# Patient Record
Sex: FEMALE | Race: WHITE | Employment: OTHER | URBAN - METROPOLITAN AREA
[De-identification: names, ages, dates, MRNs, and addresses within clinical notes are randomized per-mention and may not be internally consistent; named-entity substitution may affect disease eponyms.]

---

## 2017-08-14 ENCOUNTER — HOSPITAL ENCOUNTER (OUTPATIENT)
Dept: SURGERY | Age: 67
Discharge: HOME | End: 2017-08-14
Admitting: PODIATRIST

## 2017-08-14 VITALS
HEIGHT: 66 IN | SYSTOLIC BLOOD PRESSURE: 94 MMHG | OXYGEN SATURATION: 98 % | RESPIRATION RATE: 16 BRPM | HEART RATE: 71 BPM | TEMPERATURE: 98.2 F | BODY MASS INDEX: 22.6 KG/M2 | WEIGHT: 140.6 LBS | DIASTOLIC BLOOD PRESSURE: 58 MMHG

## 2017-08-14 RX ORDER — HYDROCODONE BITARTRATE AND ACETAMINOPHEN 7.5; 325 MG/1; MG/1
.5-1 TABLET ORAL
COMMUNITY
Start: 2017-06-24

## 2017-08-14 NOTE — PERIOP NOTES
Surgical orders not yet received for preassessment today. Called office and spoke with Leyda Ceja. Requested orders to be faxed immediately. Leyda Ceja confirms that no labs are ordered and states that she will fax now to [phone].

## 2017-08-14 NOTE — PERIOP NOTES
Patient verified name, , and surgery as listed in [facility]. TYPE  CASE:1A            Orders: received. Labs per surgeon:  none   Labs per anesthesia protocol: none  EKG  :  Not required      Instructed patient to continue previous medications as prescribed prior to surgery and  to take the following medications the day of surgery according to anesthesia guidelines with a small sip of water : Bystolic, Prevacid and Norco       Continue all previous medications unless otherwise directed. Instructed patient to hold  the following medications prior to surgery: none    Patient instructed on the following and verbalized understanding:  Arrive at [facility] entrance, time of arrival to be called the day before by 1700. Responsible adult must drive patient to and from hospital, stay during surgery and 24 hours postoperatively. Npo after midnight including gum, mints and ice chips. Shower using non-moisturizing soap the night before and the morning of surgery. Leave all valuables at home. Instructed on no jewelry or body piercings on the dos. Bring insurance card and ID. No perfumes, oil, powder, colognes, makeup or  lotions on the skin. Patient verbalized understanding of all instructions and provided all medical/health information to the best of their ability.

## 2017-08-16 NOTE — PROGRESS NOTES
Received consult to assist Ms. Silvestre Trujillo with a HCPOA prior to surgery on 8.18.17. Spoke with Ms. Silvestre Trujillo via phone on 8.16.17 and she declined needing assistance with the document prior to surgery. Ms. Silvestre Trujillo indicated that she already had a will.     Jerson Choudhury 69  Board Certified

## 2017-08-16 NOTE — ACP (ADVANCE CARE PLANNING)
Received consult to assist Ms. Hellen Bartlett with a HCPOA prior to surgery on 8.18.17. Spoke with Ms. Hellen Bartlett via phone on 8.16.17 and she declined needing assistance with the document prior to surgery. Ms. Hellen Bartlett indicated that she already had a will.     Jerson Gonzalez 69  Board Certified

## 2017-08-17 ENCOUNTER — ANESTHESIA EVENT (OUTPATIENT)
Dept: SURGERY | Age: 67
End: 2017-08-17
Payer: MEDICARE

## 2017-08-17 RX ORDER — FENTANYL CITRATE 50 UG/ML
100 INJECTION, SOLUTION INTRAMUSCULAR; INTRAVENOUS ONCE
Status: CANCELLED | OUTPATIENT
Start: 2017-08-17 | End: 2017-08-17

## 2017-08-18 ENCOUNTER — ANESTHESIA (OUTPATIENT)
Dept: SURGERY | Age: 67
End: 2017-08-18
Payer: MEDICARE

## 2017-08-18 ENCOUNTER — HOSPITAL ENCOUNTER (OUTPATIENT)
Age: 67
Setting detail: OUTPATIENT SURGERY
Discharge: HOME | End: 2017-08-18
Attending: PODIATRIST | Admitting: PODIATRIST
Payer: MEDICARE

## 2017-08-18 VITALS
HEART RATE: 59 BPM | RESPIRATION RATE: 16 BRPM | OXYGEN SATURATION: 96 % | SYSTOLIC BLOOD PRESSURE: 103 MMHG | DIASTOLIC BLOOD PRESSURE: 70 MMHG | TEMPERATURE: 97.8 F

## 2017-08-18 PROCEDURE — 77030031139 HC SUT VCRL2 J&J -A: Performed by: PODIATRIST

## 2017-08-18 PROCEDURE — 77030018836 HC SOL IRR NACL ICUM -A: Performed by: PODIATRIST

## 2017-08-18 PROCEDURE — 74011250636 HC RX REV CODE- 250/636: Performed by: PODIATRIST

## 2017-08-18 PROCEDURE — 77030002916 HC SUT ETHLN J&J -A: Performed by: PODIATRIST

## 2017-08-18 PROCEDURE — 77030011640 HC PAD GRND REM COVD -A: Performed by: PODIATRIST

## 2017-08-18 PROCEDURE — 77030019940 HC BLNKT HYPOTHRM STRY -B: Performed by: ANESTHESIOLOGY

## 2017-08-18 PROCEDURE — 74011250636 HC RX REV CODE- 250/636

## 2017-08-18 PROCEDURE — 74011000250 HC RX REV CODE- 250

## 2017-08-18 PROCEDURE — 76060000032 HC ANESTHESIA 0.5 TO 1 HR: Performed by: PODIATRIST

## 2017-08-18 PROCEDURE — 76210000026 HC REC RM PH II 1 TO 1.5 HR: Performed by: PODIATRIST

## 2017-08-18 PROCEDURE — 76010000138 HC OR TIME 0.5 TO 1 HR: Performed by: PODIATRIST

## 2017-08-18 PROCEDURE — 77030000032 HC CUF TRNQT ZIMM -B: Performed by: PODIATRIST

## 2017-08-18 PROCEDURE — 74011250636 HC RX REV CODE- 250/636: Performed by: ANESTHESIOLOGY

## 2017-08-18 PROCEDURE — 88305 TISSUE EXAM BY PATHOLOGIST: CPT | Performed by: PODIATRIST

## 2017-08-18 RX ORDER — PROMETHAZINE HYDROCHLORIDE 25 MG/1
25 TABLET ORAL
Qty: 30 TAB | Refills: 0 | Status: SHIPPED | OUTPATIENT
Start: 2017-08-18 | End: 2017-09-01

## 2017-08-18 RX ORDER — PROPOFOL 10 MG/ML
INJECTION, EMULSION INTRAVENOUS
Status: DISCONTINUED | OUTPATIENT
Start: 2017-08-18 | End: 2017-08-18 | Stop reason: HOSPADM

## 2017-08-18 RX ORDER — CEFAZOLIN SODIUM IN 0.9 % NACL 2 G/50 ML
2 INTRAVENOUS SOLUTION, PIGGYBACK (ML) INTRAVENOUS ONCE
Status: COMPLETED | OUTPATIENT
Start: 2017-08-18 | End: 2017-08-18

## 2017-08-18 RX ORDER — ALBUTEROL SULFATE 0.83 MG/ML
2.5 SOLUTION RESPIRATORY (INHALATION) AS NEEDED
Status: DISCONTINUED | OUTPATIENT
Start: 2017-08-18 | End: 2017-08-18 | Stop reason: HOSPADM

## 2017-08-18 RX ORDER — SODIUM CHLORIDE 0.9 % (FLUSH) 0.9 %
5-10 SYRINGE (ML) INJECTION EVERY 8 HOURS
Status: DISCONTINUED | OUTPATIENT
Start: 2017-08-18 | End: 2017-08-18 | Stop reason: HOSPADM

## 2017-08-18 RX ORDER — MIDAZOLAM HYDROCHLORIDE 1 MG/ML
2 INJECTION, SOLUTION INTRAMUSCULAR; INTRAVENOUS ONCE
Status: DISCONTINUED | OUTPATIENT
Start: 2017-08-18 | End: 2017-08-18 | Stop reason: HOSPADM

## 2017-08-18 RX ORDER — SODIUM CHLORIDE 0.9 % (FLUSH) 0.9 %
5-10 SYRINGE (ML) INJECTION AS NEEDED
Status: DISCONTINUED | OUTPATIENT
Start: 2017-08-18 | End: 2017-08-18 | Stop reason: HOSPADM

## 2017-08-18 RX ORDER — PROPOFOL 10 MG/ML
INJECTION, EMULSION INTRAVENOUS AS NEEDED
Status: DISCONTINUED | OUTPATIENT
Start: 2017-08-18 | End: 2017-08-18 | Stop reason: HOSPADM

## 2017-08-18 RX ORDER — OXYCODONE HYDROCHLORIDE 5 MG/1
5 TABLET ORAL
Status: DISCONTINUED | OUTPATIENT
Start: 2017-08-18 | End: 2017-08-18 | Stop reason: HOSPADM

## 2017-08-18 RX ORDER — MIDAZOLAM HYDROCHLORIDE 1 MG/ML
2 INJECTION, SOLUTION INTRAMUSCULAR; INTRAVENOUS
Status: DISCONTINUED | OUTPATIENT
Start: 2017-08-18 | End: 2017-08-18 | Stop reason: HOSPADM

## 2017-08-18 RX ORDER — SODIUM CHLORIDE, SODIUM LACTATE, POTASSIUM CHLORIDE, CALCIUM CHLORIDE 600; 310; 30; 20 MG/100ML; MG/100ML; MG/100ML; MG/100ML
75 INJECTION, SOLUTION INTRAVENOUS CONTINUOUS
Status: DISCONTINUED | OUTPATIENT
Start: 2017-08-18 | End: 2017-08-18 | Stop reason: HOSPADM

## 2017-08-18 RX ORDER — LIDOCAINE HYDROCHLORIDE 20 MG/ML
INJECTION, SOLUTION EPIDURAL; INFILTRATION; INTRACAUDAL; PERINEURAL AS NEEDED
Status: DISCONTINUED | OUTPATIENT
Start: 2017-08-18 | End: 2017-08-18 | Stop reason: HOSPADM

## 2017-08-18 RX ORDER — SODIUM CHLORIDE, SODIUM LACTATE, POTASSIUM CHLORIDE, CALCIUM CHLORIDE 600; 310; 30; 20 MG/100ML; MG/100ML; MG/100ML; MG/100ML
50 INJECTION, SOLUTION INTRAVENOUS CONTINUOUS
Status: DISCONTINUED | OUTPATIENT
Start: 2017-08-18 | End: 2017-08-18 | Stop reason: HOSPADM

## 2017-08-18 RX ORDER — LIDOCAINE HYDROCHLORIDE 10 MG/ML
0.1 INJECTION INFILTRATION; PERINEURAL AS NEEDED
Status: DISCONTINUED | OUTPATIENT
Start: 2017-08-18 | End: 2017-08-18 | Stop reason: HOSPADM

## 2017-08-18 RX ORDER — CEPHALEXIN 500 MG/1
500 CAPSULE ORAL 3 TIMES DAILY
Qty: 30 CAP | Refills: 0 | Status: SHIPPED | OUTPATIENT
Start: 2017-08-18 | End: 2017-08-28

## 2017-08-18 RX ORDER — MEPERIDINE HYDROCHLORIDE 50 MG/1
50 TABLET ORAL
Qty: 25 TAB | Refills: 0 | Status: SHIPPED | OUTPATIENT
Start: 2017-08-18 | End: 2018-12-13

## 2017-08-18 RX ADMIN — SODIUM CHLORIDE, SODIUM LACTATE, POTASSIUM CHLORIDE, AND CALCIUM CHLORIDE 75 ML/HR: 600; 310; 30; 20 INJECTION, SOLUTION INTRAVENOUS at 11:14

## 2017-08-18 RX ADMIN — PROPOFOL 250 MCG/KG/MIN: 10 INJECTION, EMULSION INTRAVENOUS at 13:41

## 2017-08-18 RX ADMIN — LIDOCAINE HYDROCHLORIDE 40 MG: 20 INJECTION, SOLUTION EPIDURAL; INFILTRATION; INTRACAUDAL; PERINEURAL at 13:43

## 2017-08-18 RX ADMIN — PROPOFOL 40 MG: 10 INJECTION, EMULSION INTRAVENOUS at 13:41

## 2017-08-18 RX ADMIN — PROPOFOL 10 MG: 10 INJECTION, EMULSION INTRAVENOUS at 13:42

## 2017-08-18 RX ADMIN — CEFAZOLIN 2 G: 1 INJECTION, POWDER, FOR SOLUTION INTRAMUSCULAR; INTRAVENOUS; PARENTERAL at 13:38

## 2017-08-18 NOTE — IP AVS SNAPSHOT
[address] 
[phone] Patient: Bj Urbina MRN: [id number] RX You are allergic to the following Allergen Reactions Dilaudid (Hydromorphone (Bulk)) Other (comments) Hypotension Doxycycline Unable to Obtain \"I can't remember\" Fluoxetine Unknown (comments) Pt states \"induced afib\" Ketorolac Tromethamine Other (comments) Could not focus eye- could not see Morphine Itching Nausea and Vomiting Narcotic Antagonist Itching Nausea and Vomiting Other (comments) Narcotics--- not narcotic antagonisthallucinations Sulfa (Sulfonamide Antibiotics) Itching Triamcinolone Other (comments) \"couldnt focus eyes\"- could not see Recent Documentation OB Status Smoking Status Hysterectomy Current Every Day Smoker Emergency Contacts Name Discharge Info Relation Home Work Mobile Brendan Mckeon NO [2] Friend [5] [phone] About your hospitalization You were admitted on:  2017 You last received care in the:  [facility] OP PACU You were discharged on:  2017 Unit phone number:  [phone] Why you were hospitalized Your primary diagnosis was:  Not on File Providers Seen During Your Hospitalizations Provider Role Specialty Primary office phone Lacy Dominguez DPM Attending Provider Podiatry [phone] Your Primary Care Physician (PCP) Primary Care Physician Office Phone Office Fax [address] [facility] [phone] Follow-up Information Follow up With Details Comments Contact Info Falguni Daniel MD   [address] 
[phone] Current Discharge Medication List  
  
START taking these medications Dose & Instructions Dispensing Information Comments Morning Noon Evening Bedtime  
 cephALEXin 500 mg capsule Commonly known as:  Shilpa Leggett Your last dose was: Your next dose is:    
   
   
 Dose:  500 mg Take 1 Cap by mouth three (3) times daily for 10 days. Quantity:  30 Cap Refills:  0  
     
   
   
   
  
 meperidine 50 mg tablet Commonly known as:  DEMEROL Your last dose was: Your next dose is:    
   
   
 Dose:  50 mg Take 1 Tab by mouth every six (6) hours as needed (for severe pain). Max Daily Amount: 200 mg. Quantity:  25 Tab Refills:  0  
     
   
   
   
  
 promethazine 25 mg tablet Commonly known as:  PHENERGAN Your last dose was: Your next dose is:    
   
   
 Dose:  25 mg Take 1 Tab by mouth every six (6) hours as needed for Nausea for up to 14 days. Quantity:  30 Tab Refills:  0 CONTINUE these medications which have CHANGED Dose & Instructions Dispensing Information Comments Morning Noon Evening Bedtime  
 nebivolol 5 mg tablet Commonly known as:  BYSTOLIC What changed:  when to take this Your last dose was: Your next dose is:    
   
   
 Dose:  5 mg Take 1 Tab by mouth daily. Quantity:  30 Tab Refills:  11 CONTINUE these medications which have NOT CHANGED Dose & Instructions Dispensing Information Comments Morning Noon Evening Bedtime ALPRAZolam 1 mg tablet Commonly known as:  Zenia Atkins Your last dose was: Your next dose is:    
   
   
 Dose:  1 mg Take 1 mg by mouth nightly as needed. Refills:  0  
     
   
   
   
  
 carisoprodol 350 mg tablet Commonly known as:  SOMA Your last dose was: Your next dose is:    
   
   
 Dose:  350 mg Take 350 mg by mouth every eight (8) hours as needed (\"very seldom\"). Indications: MUSCLE SPASM Refills:  0 HYDROcodone-acetaminophen 7.5-325 mg per tablet Commonly known as:  Sarahy Cannon Your last dose was: Your next dose is:    
   
   
 Dose:  0.5-1 Tab Take 0.5-1 Tabs by mouth every six (6) hours as needed (\"fibromyalgia and other pain\"). Refills:  0  
     
   
   
   
  
 lansoprazole 15 mg capsule Commonly known as:  PREVACID Your last dose was: Your next dose is:    
   
   
 Dose:  15 mg Take 15 mg by mouth daily as needed. Indications: HEARTBURN Refills:  0  
     
   
   
   
  
 magnesium oxide 400 mg tablet Commonly known as:  MAG-OX Your last dose was: Your next dose is:    
   
   
 Dose:  400 mg Take 400 mg by mouth daily. Refills:  0  
     
   
   
   
  
 potassium chloride 10 mEq tablet Commonly known as:  KLOR-CON Your last dose was: Your next dose is:    
   
   
 Dose:  10 mEq Take 1 Tab by mouth daily. Quantity:  90 Tab Refills:  3  
     
   
   
   
  
 simvastatin 40 mg tablet Commonly known as:  ZOCOR Your last dose was: Your next dose is:    
   
   
 Dose:  40 mg Take 40 mg by mouth nightly. Refills:  0  
     
   
   
   
  
 VITAMIN D3 PO Your last dose was: Your next dose is:    
   
   
 Dose:  1500 Units Take 1,500 Units by mouth daily. Refills:  0 Where to Get Your Medications Information on where to get these meds will be given to you by the nurse or doctor. ! Ask your nurse or doctor about these medications  
  cephALEXin 500 mg capsule  
 meperidine 50 mg tablet  
 promethazine 25 mg tablet Discharge Instructions Ice and elevate left foot. Patient to remain nonweightbearing with crutches. [facility] Post operative instructions and recommendations for your personal comfort following foot surgery 1.  Upon arrival home, lie down and elevate your feet and legs approximately 16 on pillows. Also, support your knees with pillows. 2. Post-operative swelling is greatly reduced by the use of ice packs. Ice packs should be applied over the top or bottom of the bandage every twenty minutes on the hour until returning to the office. 3. Take medication prescribed by the doctor according to directions. Avoid taking medication on an empty stomach. 4. Remain quiet and off of your feet as much as possible for the first 48 hours. After this period use discretion and common sense regarding the amount of standing or walking you do. Generally, post-operative patients should stay off their feet for 24-48hours. After this period walking to tolerance is usually allowed (unless otherwise specified by your doctor). 5. Do not be alarmed if there is some slight bleeding on the bandages; good blood supply is essential for normal tissue healing. 6. Keep feet elevated as much as possible for the first three days. Generally above the chest is most desirable. 7. Keep bandages completely dry. 8. Do not remove the surgical bandages. 9. The successful result of your surgery depends on the careful following of these instructions. 10. Please refrain from all alcoholic beverages. 11. If there are any questions or problems, please feel free to phone the doctor at the office. Raúl Zapata, [address] [place] [address] [phone] · 414 [facility] [phone] DIET · Clear liquids until no nausea or vomiting; then light diet for the first day. · Advance to regular diet on second day, unless your doctor orders otherwise. · If nausea and vomiting continues, call your doctor. PAIN 
· Take pain medication as directed by your doctor. · Call your doctor if pain is NOT relieved by medication. CALL YOUR DOCTOR IF  
· Excessive bleeding that does not stop after holding pressure over the area · Temperature of 101 degrees F or above · Excessive redness, swelling or bruising, and/ or green or yellow, smelly discharge from incision AFTER ANESTHESIA · For the first 24 hours: DO NOT Drive, Drink alcoholic beverages, or Make important decisions. · Be aware of dizziness following anesthesia and while taking pain medication. APPOINTMENT DATE/ TIME keep as arranged DISCHARGE SUMMARY from Nurse PATIENT INSTRUCTIONS: 
 
After general anesthesia or intravenous sedation, for 24 hours or while taking prescription Narcotics: · Limit your activities · Do not drive and operate hazardous machinery · Do not make important personal or business decisions · Do  not drink alcoholic beverages · If you have not urinated within 8 hours after discharge, please contact your surgeon on call. *  Please give a list of your current medications to your Primary Care Provider. *  Please update this list whenever your medications are discontinued, doses are 
    changed, or new medications (including over-the-counter products) are added. *  Please carry medication information at all times in case of emergency situations. These are general instructions for a healthy lifestyle: No smoking/ No tobacco products/ Avoid exposure to second hand smoke Surgeon General's Warning:  Quitting smoking now greatly reduces serious risk to your health. Obesity, smoking, and sedentary lifestyle greatly increases your risk for illness A healthy diet, regular physical exercise & weight monitoring are important for maintaining a healthy lifestyle You may be retaining fluid if you have a history of heart failure or if you experience any of the following symptoms:  Weight gain of 3 pounds or more overnight or 5 pounds in a week, increased swelling in our hands or feet or shortness of breath while lying flat in bed. Please call your doctor as soon as you notice any of these symptoms; do not wait until your next office visit. Recognize signs and symptoms of STROKE: 
 
F-face looks uneven A-arms unable to move or move unevenly S-speech slurred or non-existent T-time-call 911 as soon as signs and symptoms begin-DO NOT go Back to bed or wait to see if you get better-TIME IS BRAIN. Discharge Orders None Introducing [facility]! [facility] introduces [employer] patient portal. Now you can access parts of your medical record, email your doctor's office, and request medication refills online. 1. In your internet browser, go to [email or web address] 2. Click on the First Time User? Click Here link in the Sign In box. You will see the New Member Sign Up page. 3. Enter your [employer] Access Code exactly as it appears below. You will not need to use this code after youve completed the sign-up process. If you do not sign up before the expiration date, you must request a new code. · [employer] Access Code: [email or web address] Expires: 11/5/2017  3:20 PM 
 
4. Enter the last four digits of your Social Security Number (xxxx) and Date of Birth (mm/dd/yyyy) as indicated and click Submit. You will be taken to the next sign-up page. 5. Create a [employer] ID. This will be your [employer] login ID and cannot be changed, so think of one that is secure and easy to remember. 6. Create a [employer] password. You can change your password at any time. 7. Enter your Password Reset Question and Answer. This can be used at a later time if you forget your password. 8. Enter your e-mail address. You will receive e-mail notification when new information is available in [address] 9. Click Sign Up. You can now view and download portions of your medical record.  
10. Click the Download Summary menu link to download a portable copy of your medical information. If you have questions, please visit the Frequently Asked Questions section of the [employer] website. Remember, MyChart is NOT to be used for urgent needs. For medical emergencies, dial 911. Now available from your iPhone and Android! General Information Please provide this summary of care documentation to your next provider. Patient Signature:  ____________________________________________________________ Date:  ____________________________________________________________  
  
[place] [facility] Provider Signature:  ____________________________________________________________ Date:  ____________________________________________________________

## 2017-08-18 NOTE — ANESTHESIA POSTPROCEDURE EVALUATION
Post-Anesthesia Evaluation and Assessment    Patient: Clary Boeck MRN: [id number]  SSN: [id number]    YOB: 1950  Age: 78 y.o. Sex: female       Cardiovascular Function/Vital Signs  Visit Vitals    BP 96/60    Pulse 70    Temp 36.6 °C (97.8 °F)    Resp 16    SpO2 96%       Patient is status post total IV anesthesia anesthesia for Procedure(s):  PLANTAR FASCIA REPAIR LEFT FOOT. Nausea/Vomiting: None    Postoperative hydration reviewed and adequate. Pain:      Managed    Neurological Status:   Neuro (WDL): Within Defined Limits (08/18/17 1037)   At baseline    Mental Status and Level of Consciousness: Arousable    Pulmonary Status:   O2 Device: Room air (08/18/17 1429)   Adequate oxygenation and airway patent    Complications related to anesthesia: None    Post-anesthesia assessment completed.  No concerns    Signed By: Milady Love MD     August 18, 2017

## 2017-08-18 NOTE — DISCHARGE INSTRUCTIONS
Ice and elevate left foot. Patient to remain nonweightbearing with crutches. [facility]    Post operative instructions and recommendations for your personal comfort following foot surgery    1. Upon arrival home, lie down and elevate your feet and legs approximately 16 on pillows. Also, support your knees with pillows. 2. Post-operative swelling is greatly reduced by the use of ice packs. Ice packs should be applied over the top or bottom of the bandage every twenty minutes on the hour until returning to the office. 3. Take medication prescribed by the doctor according to directions. Avoid taking medication on an empty stomach. 4. Remain quiet and off of your feet as much as possible for the first 48 hours. After this period use discretion and common sense regarding the amount of standing or walking you do. Generally, post-operative patients should stay off their feet for 24-48hours. After this period walking to tolerance is usually allowed (unless otherwise specified by your doctor). 5. Do not be alarmed if there is some slight bleeding on the bandages; good blood supply is essential for normal tissue healing. 6. Keep feet elevated as much as possible for the first three days. Generally above the chest is most desirable. 7. Keep bandages completely dry. 8. Do not remove the surgical bandages. 9. The successful result of your surgery depends on the careful following of these instructions. 10. Please refrain from all alcoholic beverages. 11. If there are any questions or problems, please feel free to phone the doctor at the office.     Raúl Zapata, [facility] [address] [place] [address] [phone]  · 414 [facility] [phone]                                                                                                                                                                            DIET  · Clear liquids until no nausea or vomiting; then light diet for the first day. · Advance to regular diet on second day, unless your doctor orders otherwise. · If nausea and vomiting continues, call your doctor. PAIN  · Take pain medication as directed by your doctor. · Call your doctor if pain is NOT relieved by medication. CALL YOUR DOCTOR IF   · Excessive bleeding that does not stop after holding pressure over the area  · Temperature of 101 degrees F or above  · Excessive redness, swelling or bruising, and/ or green or yellow, smelly discharge from incision    AFTER ANESTHESIA   · For the first 24 hours: DO NOT Drive, Drink alcoholic beverages, or Make important decisions. · Be aware of dizziness following anesthesia and while taking pain medication. APPOINTMENT DATE/ TIME keep as arranged       DISCHARGE SUMMARY from Nurse    PATIENT INSTRUCTIONS:    After general anesthesia or intravenous sedation, for 24 hours or while taking prescription Narcotics:  · Limit your activities  · Do not drive and operate hazardous machinery  · Do not make important personal or business decisions  · Do  not drink alcoholic beverages  · If you have not urinated within 8 hours after discharge, please contact your surgeon on call. *  Please give a list of your current medications to your Primary Care Provider. *  Please update this list whenever your medications are discontinued, doses are      changed, or new medications (including over-the-counter products) are added. *  Please carry medication information at all times in case of emergency situations. These are general instructions for a healthy lifestyle:    No smoking/ No tobacco products/ Avoid exposure to second hand smoke    Surgeon General's Warning:  Quitting smoking now greatly reduces serious risk to your health.     Obesity, smoking, and sedentary lifestyle greatly increases your risk for illness    A healthy diet, regular physical exercise & weight monitoring are important for maintaining a healthy lifestyle    You may be retaining fluid if you have a history of heart failure or if you experience any of the following symptoms:  Weight gain of 3 pounds or more overnight or 5 pounds in a week, increased swelling in our hands or feet or shortness of breath while lying flat in bed. Please call your doctor as soon as you notice any of these symptoms; do not wait until your next office visit. Recognize signs and symptoms of STROKE:    F-face looks uneven    A-arms unable to move or move unevenly    S-speech slurred or non-existent    T-time-call 911 as soon as signs and symptoms begin-DO NOT go       Back to bed or wait to see if you get better-TIME IS BRAIN.

## 2017-08-18 NOTE — ANESTHESIA PREPROCEDURE EVALUATION
Anesthetic History   No history of anesthetic complications            Review of Systems / Medical History  Patient summary reviewed, nursing notes reviewed and pertinent labs reviewed    Pulmonary          Smoker         Neuro/Psych         Psychiatric history     Cardiovascular    Hypertension: well controlled        Dysrhythmias : atrial fibrillation  CAD (s/p angioplasty- no stent)    Exercise tolerance: >4 METS  Comments: Hypertrophic Obstructive cardiomyopathy    EF 55%   GI/Hepatic/Renal     GERD: well controlled           Endo/Other  Within defined limits           Other Findings              Physical Exam    Airway  Mallampati: II      Mouth opening: Normal     Cardiovascular               Dental  No notable dental hx       Pulmonary  Breath sounds clear to auscultation               Abdominal         Other Findings            Anesthetic Plan    ASA: 3  Anesthesia type: total IV anesthesia          Induction: Intravenous  Anesthetic plan and risks discussed with: Patient

## 2017-08-18 NOTE — H&P
Patient: Lakshmi Duron MRN: [id number]  SSN: [id number]    YOB: 1950  Age: 78 y.o. Sex: female      History and Physical    Lakshmi Duron is a 78 y.o. female having Procedure(s):  PLANTAR FASCIA REPAIR LEFT FOOT. Allergies:    Allergies   Allergen Reactions    Dilaudid [Hydromorphone (Bulk)] Other (comments)     Hypotension      Doxycycline Unable to Obtain     \"I can't remember\"    Fluoxetine Unknown (comments)     Pt states \"induced afib\"    Ketorolac Tromethamine Other (comments)     Could not focus eye- could not see    Morphine Itching and Nausea and Vomiting    Narcotic Antagonist Itching, Nausea and Vomiting and Other (comments)     Narcotics--- not narcotic antagonisthallucinations    Sulfa (Sulfonamide Antibiotics) Itching    Triamcinolone Other (comments)     \"couldnt focus eyes\"- could not see        Chief Complaint: left foot pain     History of Present Illness: left plantar fascia problem    Past Medical History:   Diagnosis Date    A-fib ([address]     bystolic, conrolled    Anxiety     prn xanax    CAD (coronary artery disease)     no stents (cath 1/25/2016) \"Dr Arnold cleared it without a stent\"-----     Cardiomyopathy ([address]     6/9/14 LVEF 55-60% ECHO \"Mid cavity obliteration Nl EF stable on current regimen\" 10/13/16 Dr Junior Taveras    ----genetic hypertophy cardiomyopathy    Fibromyalgia     GERD (gastroesophageal reflux disease)     prevacid prn--  \"diet controlled mostly\"    Heart failure ([address]     Hypertension     managed with meds    Ill-defined condition     HOCM    Multiple allergies     Tinnitus       Past Surgical History:   Procedure Laterality Date    HX APPENDECTOMY  1963    HX GYN      multiple procedures to remove endometriosis    HX HEART CATHETERIZATION  01/25/2016    no stents required    HX HEENT  as child    tonsilectomy    HX HEENT      lump/cyst removed from forehead    HX HYSTERECTOMY  1980    endemetriosis, hysterectomy    HX SALPINGO-OOPHORECTOMY Bilateral       Family History   Problem Relation Age of Onset    Heart defect Mother      ANETA-  47 during ablation    Alcohol abuse Father       64- alchol      Social History   Substance Use Topics    Smoking status: Current Every Day Smoker     Packs/day: 0.25     Years: 50.00    Smokeless tobacco: Never Used    Alcohol use No        Prior to Admission medications    Medication Sig Start Date End Date Taking? Authorizing Provider   HYDROcodone-acetaminophen (NORCO) 7.5-325 mg per tablet Take 0.5-1 Tabs by mouth every six (6) hours as needed (\"fibromyalgia and other pain\"). 17  Yes Historical Provider   nebivolol (BYSTOLIC) 5 mg tablet Take 1 Tab by mouth daily. Patient taking differently: Take 5 mg by mouth every morning. 17  Yes Ebony Martínez MD   potassium chloride (K-DUR, KLOR-CON) 10 mEq tablet Take 1 Tab by mouth daily. 16  Yes Ebony Martínez MD   magnesium oxide (MAG-OX) 400 mg tablet Take 400 mg by mouth daily. Yes Historical Provider   simvastatin (ZOCOR) 40 mg tablet Take 40 mg by mouth nightly. Yes Historical Provider   CHOLECALCIFEROL, VITAMIN D3, (VITAMIN D3 PO) Take 1,500 Units by mouth daily. Yes Historical Provider   lansoprazole (PREVACID) 15 mg capsule Take 15 mg by mouth daily as needed. Indications: HEARTBURN   Yes Historical Provider   carisoprodol (SOMA) 350 mg tablet Take 350 mg by mouth every eight (8) hours as needed (\"very seldom\"). Indications: MUSCLE SPASM 5/10/10  Yes Manuel Salinas MD   alprazolam [place]) 1 mg tablet Take 1 mg by mouth nightly as needed. Yes Manuel Salinas MD        Visit Vitals    BP 95/58 (BP 1 Location: Left arm, BP Patient Position: At rest)    Pulse 80    Temp 36.9 °C (98.4 °F)    Resp 16    SpO2 97%        Assessment and Plan:   Monroe Medellin is a 78 y.o. female having Procedure(s):  [address] for left foot pain.     Preanesthesia Evaluation     Last edited 17 1141 by Li Mckeon Tierra Marie MD             Anesthetic History   No history of anesthetic complications            Review of Systems / Medical History  Patient summary reviewed, nursing notes reviewed and pertinent labs reviewed    Pulmonary          Smoker         Neuro/Psych         Psychiatric history     Cardiovascular    Hypertension: well controlled        Dysrhythmias : atrial fibrillation  CAD (s/p angioplasty- no stent)    Exercise tolerance: >4 METS  Comments: Hypertrophic Obstructive cardiomyopathy    EF 55%   GI/Hepatic/Renal     GERD: well controlled           Endo/Other  Within defined limits           Other Findings              Physical Exam    Airway  Mallampati: II      Mouth opening: Normal     Cardiovascular               Dental  No notable dental hx       Pulmonary  Breath sounds clear to auscultation               Abdominal         Other Findings            Anesthetic Plan    ASA: 3  Anesthesia type: total IV anesthesia          Induction: Intravenous  Anesthetic plan and risks discussed with: Patient               Preanesthesia evaluation performed by Az Hutchinson MD            Signed By: Az Hutchinson MD     August 18, 2017

## 2017-08-18 NOTE — PERIOP NOTES
PT was not in waiting room after arrival when I went to get her. Pt found in waiting room several minutes later, second attempt to call. States she told her ride to leave- that it was going to take 2 hours for her to wait before her surgery. Explained to pt that we need her support person here when she goes back-does not have his number.

## 2017-08-18 NOTE — IP AVS SNAPSHOT
[address] 
[phone] Patient: Manon Kussmaul MRN: [id number] Current Discharge Medication List  
  
START taking these medications Dose & Instructions Dispensing Information Comments Morning Noon Evening Bedtime  
 cephALEXin 500 mg capsule Commonly known as:  Mark Moreno Your last dose was: Your next dose is:    
   
   
 Dose:  500 mg Take 1 Cap by mouth three (3) times daily for 10 days. Quantity:  30 Cap Refills:  0  
     
   
   
   
  
 meperidine 50 mg tablet Commonly known as:  DEMEROL Your last dose was: Your next dose is:    
   
   
 Dose:  50 mg Take 1 Tab by mouth every six (6) hours as needed (for severe pain). Max Daily Amount: 200 mg. Quantity:  25 Tab Refills:  0  
     
   
   
   
  
 promethazine 25 mg tablet Commonly known as:  PHENERGAN Your last dose was: Your next dose is:    
   
   
 Dose:  25 mg Take 1 Tab by mouth every six (6) hours as needed for Nausea for up to 14 days. Quantity:  30 Tab Refills:  0 CONTINUE these medications which have CHANGED Dose & Instructions Dispensing Information Comments Morning Noon Evening Bedtime  
 nebivolol 5 mg tablet Commonly known as:  BYSTOLIC What changed:  when to take this Your last dose was: Your next dose is:    
   
   
 Dose:  5 mg Take 1 Tab by mouth daily. Quantity:  30 Tab Refills:  11 CONTINUE these medications which have NOT CHANGED Dose & Instructions Dispensing Information Comments Morning Noon Evening Bedtime ALPRAZolam 1 mg tablet Commonly known as:  Tracey Belling Your last dose was: Your next dose is:    
   
   
 Dose:  1 mg Take 1 mg by mouth nightly as needed. Refills:  0  
     
   
   
   
  
 carisoprodol 350 mg tablet Commonly known as:  SOMA Your last dose was: Your next dose is:    
   
   
 Dose:  350 mg Take 350 mg by mouth every eight (8) hours as needed (\"very seldom\"). Indications: MUSCLE SPASM Refills:  0 HYDROcodone-acetaminophen 7.5-325 mg per tablet Commonly known as:  [place] Your last dose was: Your next dose is:    
   
   
 Dose:  0.5-1 Tab Take 0.5-1 Tabs by mouth every six (6) hours as needed (\"fibromyalgia and other pain\"). Refills:  0  
     
   
   
   
  
 lansoprazole 15 mg capsule Commonly known as:  PREVACID Your last dose was: Your next dose is:    
   
   
 Dose:  15 mg Take 15 mg by mouth daily as needed. Indications: HEARTBURN Refills:  0  
     
   
   
   
  
 magnesium oxide 400 mg tablet Commonly known as:  MAG-OX Your last dose was: Your next dose is:    
   
   
 Dose:  400 mg Take 400 mg by mouth daily. Refills:  0  
     
   
   
   
  
 potassium chloride 10 mEq tablet Commonly known as:  KLOR-CON Your last dose was: Your next dose is:    
   
   
 Dose:  10 mEq Take 1 Tab by mouth daily. Quantity:  90 Tab Refills:  3  
     
   
   
   
  
 simvastatin 40 mg tablet Commonly known as:  ZOCOR Your last dose was: Your next dose is:    
   
   
 Dose:  40 mg Take 40 mg by mouth nightly. Refills:  0  
     
   
   
   
  
 VITAMIN D3 PO Your last dose was: Your next dose is:    
   
   
 Dose:  1500 Units Take 1,500 Units by mouth daily. Refills:  0 Where to Get Your Medications Information on where to get these meds will be given to you by the nurse or doctor. ! Ask your nurse or doctor about these medications  
  cephALEXin 500 mg capsule  
 meperidine 50 mg tablet  
 promethazine 25 mg tablet

## 2017-08-18 NOTE — BRIEF OP NOTE
BRIEF OPERATIVE NOTE    Date of Procedure: 8/18/2017   Preoperative Diagnosis: Foot pain, left [M79.672]  Muscle hypertonia [M62.89]  Postoperative Diagnosis: left foot pain, muscle hypertonia    Procedure(s):  PLANTAR FASCIA resection and muscle fascia repair  LEFT FOOT  Surgeon(s) and Role:     * Mai Harrison DPM - Primary         Assistant Staff:       Surgical Staff:  Circ-1: Mingo Perez RN  Scrub Tech-1: Dock Mutton  Event Time In   Incision Start 1354   Incision Close 1414     Anesthesia: MAC   Estimated Blood Loss: less than 5cc  Specimens:   ID Type Source Tests Collected by Time Destination   1 : PLANTAR FASCIA Preservative Foot, left  Mai Harrison [facility] 8/18/2017 1401 Pathology      Findings: dictated   Complications: none  Implants: * No implants in log *

## 2017-08-19 NOTE — OP NOTES
[address]   OPERATIVE REPORT       Name:  Milka Walker   MR#:  [id number]   :  1950   Account #:  [de-identified]   Date of Adm:  2017       DATE OF SURGERY: 2017    PREOPERATIVE DIAGNOSIS: Plantar fascial tear with muscle belly   herniation, left foot. POSTPROCEDURE DIAGNOSES: Plantar fascial tear with muscle belly   herniation, left foot. PROCEDURE: Plantar fascial resection with muscle fascia repair,   left foot. SURGEON: Fran Ponce DPM    ANESTHESIA: IV sedation and local anesthesia consisting of 10 mL   of 1:1 mixture of 1% lidocaine plain, 0.5% Marcaine plain. HEMOSTASIS: A well-padded ankle tourniquet, 250 mmHg for a total   of 19 minutes. DESCRIPTION OF PROCEDURE: The patient was brought to the   operating room, positioned on the operating table in normal   supine position. The patient was then properly identified as   Inga Gutierrez. IV sedation, local anesthesia were administered   to the patient's left foot. The foot was prepped and draped in   the usual customary sterile technique. The foot was elevated,   exsanguinated, tourniquet was inflated to 250 mmHg. Anesthesia   was tested and found to be adequate, procedure began. Prior to entering the operating room, we marked out the   patient's area of most pain on the plantar aspect of the foot. This area had been identified on MRI as to have a plantar   fascial tear with the abductor hallucis muscle belly herniating   at this area. A V-shaped incision was made, oriented from medial to lateral   with the apex of the V being the lateral. Incision was made on the   foot with total being approximately 2-3 cm in length. This incision was   deepened through the skin and immediately the plantar fascia was   identified. There was some longitudinal tears and some fraying   of the plantar fascia. This was resected completely.    Identification of the abductor hallucis muscle was noted and   there was a tear in the fascia here as well. This was gently   repaired with 4-0 Vicryl. The area was thoroughly irrigated. Inspection was done and was checked all around the incision for   any other abnormalities and none were identified. The area was   then thoroughly irrigated. Then closure began using 3-0 nylon to reapproximate the skin. Betadine-soaked Adaptic with a dry sterile wrap was applied. The   tourniquet was released for a total of 19 minutes and immediate   capillary refill was noted to all digits. The patient tolerated the procedure and anesthesia well, without   apparent complication and transferred to the recovery room with   vital signs stable and vascular status intact. She was given both oral and written postop instructions as well   as a followup appointment in our office in 5 to 7 days. Nam MCKEON DPM CWP / [place]   D:  08/18/2017   14:35   T:  08/19/2017   12:02   Job #:  [id number]

## 2019-08-08 ENCOUNTER — HOSPITAL ENCOUNTER (OUTPATIENT)
Dept: GENERAL RADIOLOGY | Age: 69
Discharge: HOME | End: 2019-08-08
Payer: MEDICARE

## 2019-08-08 DIAGNOSIS — M54.9 BACK PAIN: ICD-10-CM

## 2019-08-08 PROCEDURE — 72110 X-RAY EXAM L-2 SPINE 4/>VWS: CPT

## 2019-10-09 ENCOUNTER — HOSPITAL ENCOUNTER (OUTPATIENT)
Dept: MAMMOGRAPHY | Age: 69
Discharge: HOME | End: 2019-10-09
Attending: NURSE PRACTITIONER
Payer: MEDICARE

## 2019-10-09 DIAGNOSIS — N95.1 MENOPAUSAL STATE: ICD-10-CM

## 2019-10-09 DIAGNOSIS — Z12.39 ENCOUNTER FOR SCREENING FOR MALIGNANT NEOPLASM OF BREAST: ICD-10-CM

## 2019-10-09 PROCEDURE — 77080 DXA BONE DENSITY AXIAL: CPT

## 2019-10-09 PROCEDURE — 77067 SCR MAMMO BI INCL CAD: CPT

## 2019-10-15 ENCOUNTER — HOSPITAL ENCOUNTER (OUTPATIENT)
Dept: MAMMOGRAPHY | Age: 69
Discharge: HOME | End: 2019-10-15
Attending: NURSE PRACTITIONER
Payer: MEDICARE

## 2019-10-15 DIAGNOSIS — R92.8 ABNORMAL SCREENING MAMMOGRAM: ICD-10-CM

## 2019-10-15 PROCEDURE — 76642 ULTRASOUND BREAST LIMITED: CPT

## 2019-10-15 PROCEDURE — 77065 DX MAMMO INCL CAD UNI: CPT

## 2022-08-30 ENCOUNTER — OFFICE VISIT (OUTPATIENT)
Dept: CARDIOLOGY CLINIC | Age: 72
End: 2022-08-30
Payer: MEDICARE

## 2022-08-30 VITALS
HEART RATE: 68 BPM | DIASTOLIC BLOOD PRESSURE: 64 MMHG | SYSTOLIC BLOOD PRESSURE: 114 MMHG | BODY MASS INDEX: 22.88 KG/M2 | WEIGHT: 142.4 LBS | HEIGHT: 66 IN

## 2022-08-30 DIAGNOSIS — R00.2 PALPITATIONS: Primary | ICD-10-CM

## 2022-08-30 DIAGNOSIS — M79.7 FIBROMYALGIA: ICD-10-CM

## 2022-08-30 PROCEDURE — 1123F ACP DISCUSS/DSCN MKR DOCD: CPT | Performed by: INTERNAL MEDICINE

## 2022-08-30 PROCEDURE — 99213 OFFICE O/P EST LOW 20 MIN: CPT | Performed by: INTERNAL MEDICINE

## 2022-08-30 RX ORDER — POTASSIUM CHLORIDE 750 MG/1
10 TABLET, EXTENDED RELEASE ORAL DAILY
Qty: 90 TABLET | Refills: 3 | Status: SHIPPED | OUTPATIENT
Start: 2022-08-30

## 2022-08-30 RX ORDER — ATENOLOL 25 MG/1
25 TABLET ORAL DAILY
Qty: 90 TABLET | Refills: 3 | Status: SHIPPED | OUTPATIENT
Start: 2022-08-30

## 2022-09-21 NOTE — PROGRESS NOTES
[facility] CARDIOLOGY  [address] [place] [address], [address] [employer] [facility], [address] [place] [address]  PHONE: [phone]    NAME: Indiana Harmon  : 1950     HPI  Indiana Harmon is a 71 y.o. female seen for a follow up visit regarding   Cardiomyopathy and Results (Labs/echo)    HPI      She is here in f/u on her Cardiomyopathy . She has gone through finding a friend and mutual patient who had committed suicide. She has had some chest pain   during the course of the event. It occurred in mid July. She was integrally involved with the whole case. Past Medical History, Past Surgical History, Family history, Social History, and Medications were all reviewed with the patient today and updated as necessary.        [unfilled]  Allergies   Allergen Reactions    Doxycycline Other (See Comments)     \"I can't remember\"    Fluoxetine Other (See Comments)     Pt states \"induced afib\"    Hydromorphone Other (See Comments)     Hypotension    Ketorolac Tromethamine Other (See Comments)     Could not focus eye- could not see    Sulfa Antibiotics Itching    Triamcinolone Other (See Comments)     \"couldnt focus eyes\"- could not see    Morphine Itching and Nausea And Vomiting     Past Medical History:   Diagnosis Date    A-fib ([address]     bystolic, conrolled    Anxiety     prn xanax    CAD (coronary artery disease)     no stents (cath 2016) \"Dr Zuniga cleared it without a stent\"-----     Cardiomyopathy ([address]     14 LVEF 55-60% ECHO \"Mid cavity obliteration Nl EF stable on current regimen\" 10/13/16 Dr Bal Germain    ----genetic hypertophy cardiomyopathy    Fibromyalgia     GERD (gastroesophageal reflux disease)     prevacid prn--  \"diet controlled mostly\"    Heart failure ([address]     Hypertension     managed with meds    Ill-defined condition     HOCM    Multiple allergies     Tinnitus      Past Surgical History:   Procedure Laterality Date    [address]  2016    no stents required    GYN 08/30/22 114/64   08/15/22 110/60   03/07/22 102/60       /64   Pulse 68   Ht 5' 6\" (1.676 m)   Wt 142 lb 6.4 oz (64.6 kg)   BMI 22.98 kg/m²       Physical Exam  Constitutional:       Appearance: Normal appearance. Cardiovascular:      Rate and Rhythm: Normal rate and regular rhythm. Heart sounds: Normal heart sounds. Pulmonary:      Breath sounds: Normal breath sounds. Abdominal:      Palpations: Abdomen is soft. Skin:     General: Skin is warm and dry. Neurological:      Mental Status: She is alert and oriented to person, place, and time. Mental status is at baseline. EKG-    Medical problems and test results were reviewed with the patient today. No results found for any visits on 08/30/22. No results found for: HBA1C, DKE4DLNM    No results found for: WBC, WBCLT, HGBPOC, HGB, HGBP, HCTPOC, HCT, PHCT, PLT, MCV    No results found for: NA, K, CL, CO2, AGAP, GLU, BUN, CREA, GFRAA, CA, GFRAA    No results found for: ALT, GGT, GGTP, APIT, APX    No results found for: CHOL, CHOLPOCT, CHOLX, CHLST, CHOLV, HDL, HDLPOC, HDLC, LDL, LDLC, VLDLC, VLDL, TGLX, TRIGL      ASSESSMENT and PLAN    Emily Carver was seen today for cardiomyopathy and results. Diagnoses and all orders for this visit:    Palpitations  recent echo with normal EF   -     atenolol (TENORMIN) 25 MG tablet; Take 1 tablet by mouth daily  -     potassium chloride (KLOR-CON M) 10 MEQ extended release tablet;  Take 1 tablet by mouth daily    Fibromyalgia/chest pain   mild with her stress related witness to                              Rosamaria Grant MD  8/30/2022  11:17 AM Staging Info: By selecting yes to the question above you will include information on AJCC 8 tumor staging in your Mohs note. Information on tumor staging will be automatically added for SCCs on the head and neck. AJCC 8 includes tumor size, tumor depth, perineural involvement and bone invasion.

## 2023-01-19 ENCOUNTER — TELEPHONE (OUTPATIENT)
Dept: CARDIOLOGY CLINIC | Age: 73
End: 2023-01-19

## 2023-01-19 NOTE — TELEPHONE ENCOUNTER
I called the pt to scheduled a Cardiac Clearance appointment. Pt states she is NOT having the oral surgery therefore, she does not need clearance at this time.    I called Lorena at the Oral Surgeons office. She said she will make a note that pt declined procedure.

## 2023-01-19 NOTE — TELEPHONE ENCOUNTER
Cardiac Clearance        Physician or Practice Requesting:[facility]  : [employer] Phone Number: [phone]  Fax Number: [phone]  Date of Surgery/Procedure: ? Type of Surgery or Procedure: removal of lower right 3rd molar  Type of Anesthesia: Propofol,versed,lidocainine,marcaine  Type of Clearance Requested: cardiac and med  Medication to Hold:?  Days to Hold: ?

## 2023-09-08 DIAGNOSIS — R00.2 PALPITATIONS: ICD-10-CM

## 2023-09-08 RX ORDER — ATENOLOL 25 MG/1
25 TABLET ORAL DAILY
Qty: 90 TABLET | Refills: 0 | Status: SHIPPED | OUTPATIENT
Start: 2023-09-08

## 2023-09-08 NOTE — TELEPHONE ENCOUNTER
Requested Prescriptions     Pending Prescriptions Disp Refills    atenolol (TENORMIN) 25 MG tablet 90 tablet 0     Sig: Take 1 tablet by mouth daily

## 2023-11-01 ENCOUNTER — OFFICE VISIT (OUTPATIENT)
Age: 73
End: 2023-11-01
Payer: MEDICARE

## 2023-11-01 VITALS
SYSTOLIC BLOOD PRESSURE: 112 MMHG | HEART RATE: 72 BPM | BODY MASS INDEX: 23.21 KG/M2 | DIASTOLIC BLOOD PRESSURE: 62 MMHG | WEIGHT: 144.4 LBS | HEIGHT: 66 IN

## 2023-11-01 DIAGNOSIS — M79.7 FIBROMYALGIA: ICD-10-CM

## 2023-11-01 DIAGNOSIS — I25.10 CORONARY ARTERY DISEASE INVOLVING NATIVE CORONARY ARTERY OF NATIVE HEART WITHOUT ANGINA PECTORIS: ICD-10-CM

## 2023-11-01 DIAGNOSIS — I42.2 HYPERTROPHIC NONOBSTRUCTIVE CARDIOMYOPATHY (HCC): Primary | ICD-10-CM

## 2023-11-01 DIAGNOSIS — R00.2 PALPITATIONS: ICD-10-CM

## 2023-11-01 PROCEDURE — 99214 OFFICE O/P EST MOD 30 MIN: CPT | Performed by: INTERNAL MEDICINE

## 2023-11-01 PROCEDURE — 1123F ACP DISCUSS/DSCN MKR DOCD: CPT | Performed by: INTERNAL MEDICINE

## 2023-11-01 RX ORDER — ATENOLOL 25 MG/1
25 TABLET ORAL DAILY
Qty: 90 TABLET | Refills: 3 | Status: SHIPPED | OUTPATIENT
Start: 2023-11-01

## 2023-11-01 RX ORDER — POTASSIUM CHLORIDE 750 MG/1
10 TABLET, EXTENDED RELEASE ORAL DAILY
Qty: 90 TABLET | Refills: 3 | Status: SHIPPED | OUTPATIENT
Start: 2023-11-01

## 2023-11-01 ASSESSMENT — ENCOUNTER SYMPTOMS
HEMATEMESIS: 0
HOARSE VOICE: 0
DOUBLE VISION: 0
HEMOPTYSIS: 0
HEMATOCHEZIA: 0
WHEEZING: 0
EYE REDNESS: 0
STRIDOR: 0
ABDOMINAL PAIN: 0

## 2023-11-01 NOTE — PROGRESS NOTES
[facility] CARDIOLOGY  [address] [place] [address], [facility]  PHONE: [phone]          23    NAME:  Chencho Jaquez  : 1950  MRN: [id number]         SUBJECTIVE:   Chencho Jaquez is a 68 y.o. female seen for a visit regarding the following:     Chief Complaint   Patient presents with    Cardiomyopathy           HPI:    Cardio problem list:  1. Palpitations  2. Cardiomyopathy-hypertrophic nonobstructive  Echo from 2022 showed an EF of 50 to 55% with mild concentric LVH, diastolic dysfunction, mild mitral regurgitation, moderately dilated left atrium. 3.  Hyperlipidemia  4. CAD: Nonobstructive-coronary angiography on 2016 showed EF at 60%, mild irregularities in the circumflex, LAD with mild irregularities, RCA with 30% mid to distal vessel disease  5. Fibromyalgia  -Previous patient of Dr. Vikram Ramos    I saw Ms. Chuck Fontanez is a pleasant 71-year-old woman in cardiovascular follow-up for hypertrophic nonobstructive cardiomyopathy, nonobstructive CAD, hyperlipidemia, palpitations. Hypertrophic cardiomyopathy-has been nonobstructive with no significant LVOT gradient, echo from 2022 showed an EF of 50 to 55%. No significant lower extremity edema orthopnea PND. Complains of NYHA class II dyspnea on exertion but likely multifactorial with underlying fibromyalgia also. Nonobstructive CAD-no complaints of any angina in any way. Hyperlipidemia-remains on simvastatin therapy with no significant myalgias. No significant palpitations or presyncope or syncope or TIAs or strokelike symptoms. Past Medical History, Past Surgical History, Family history, Social History, and Medications were all reviewed with the patient today and updated as necessary.      Allergies   Allergen Reactions    Doxycycline Other (See Comments)     \"I can't remember\"    Fluoxetine Other (See Comments)     Pt states \"induced afib\"    Hydromorphone Other (See Comments)     Hypotension    Ketorolac

## 2024-11-15 ENCOUNTER — OFFICE VISIT (OUTPATIENT)
Age: 74
End: 2024-11-15

## 2024-11-15 VITALS
WEIGHT: 139.6 LBS | HEART RATE: 70 BPM | HEIGHT: 66 IN | DIASTOLIC BLOOD PRESSURE: 60 MMHG | SYSTOLIC BLOOD PRESSURE: 114 MMHG | BODY MASS INDEX: 22.43 KG/M2

## 2024-11-15 DIAGNOSIS — I42.2 HYPERTROPHIC NONOBSTRUCTIVE CARDIOMYOPATHY (HCC): Primary | ICD-10-CM

## 2024-11-15 DIAGNOSIS — I25.10 CORONARY ARTERY DISEASE INVOLVING NATIVE CORONARY ARTERY OF NATIVE HEART WITHOUT ANGINA PECTORIS: ICD-10-CM

## 2024-11-15 DIAGNOSIS — R00.2 PALPITATIONS: ICD-10-CM

## 2024-11-15 DIAGNOSIS — M79.7 FIBROMYALGIA: ICD-10-CM

## 2024-11-15 RX ORDER — POTASSIUM CHLORIDE 750 MG/1
10 TABLET, EXTENDED RELEASE ORAL DAILY
Qty: 90 TABLET | Refills: 3 | Status: SHIPPED | OUTPATIENT
Start: 2024-11-15

## 2024-11-15 RX ORDER — ATENOLOL 25 MG/1
25 TABLET ORAL DAILY
Qty: 90 TABLET | Refills: 3 | Status: SHIPPED | OUTPATIENT
Start: 2024-11-15

## 2024-11-15 ASSESSMENT — ENCOUNTER SYMPTOMS
EYE REDNESS: 0
DOUBLE VISION: 0
STRIDOR: 0
HOARSE VOICE: 0
HEMATOCHEZIA: 0
HEMOPTYSIS: 0
ABDOMINAL PAIN: 0
HEMATEMESIS: 0
WHEEZING: 0

## 2024-11-15 NOTE — PROGRESS NOTES
[facility] CARDIOLOGY  [address] [place] [address], [address]  [place] [address]  PHONE: [phone]          11/15/24    NAME:  Elena Pereira  : 1950  MRN: [id number]         SUBJECTIVE:   Elena Pereira is a 74 y.o. female seen for a visit regarding the following:     Chief Complaint   Patient presents with    Palpitations           HPI:    Cardio problem list:  1.  Palpitations  2.  Cardiomyopathy-hypertrophic nonobstructive  Echo from 2022 showed an EF of 50 to 55% with mild concentric LVH, diastolic dysfunction, mild mitral regurgitation, moderately dilated left atrium.  3.  Hyperlipidemia  4.  CAD: Nonobstructive-coronary angiography on 2016 showed EF at 60%, mild irregularities in the circumflex, LAD with mild irregularities, RCA with 30% mid to distal vessel disease  5. Fibromyalgia  -Previous patient of Dr. Zuniga    I saw Ms. Pereira is a pleasant 74-year-old woman in cardiovascular follow-up for hypertrophic nonobstructive cardiomyopathy, nonobstructive CAD, hyperlipidemia, palpitations.    Hypertrophic cardiomyopathy-has been nonobstructive with no significant LVOT gradient, echo from 2022 showed an EF of 50 to 55%.  No significant lower extremity edema orthopnea PND.  Complains of NYHA class II dyspnea on exertion but likely multifactorial with underlying fibromyalgia also.  Baseline activity levels are poor and she says this is mainly from her fibromyalgia.    Nonobstructive CAD-no complaints of any angina in any way.    Hyperlipidemia-remains on simvastatin therapy with no significant myalgias.    No significant palpitations or presyncope or syncope or TIAs or strokelike symptoms.      Past Medical History, Past Surgical History, Family history, Social History, and Medications were all reviewed with the patient today and updated as necessary.     Allergies   Allergen Reactions    Doxycycline Other (See Comments)     \"I can't remember\"    Fluoxetine Other (See Comments)     Pt states

## (undated) DEVICE — 1010 S-DRAPE TOWEL DRAPE 10/BX: Brand: STERI-DRAPE™

## (undated) DEVICE — REM POLYHESIVE ADULT PATIENT RETURN ELECTRODE: Brand: VALLEYLAB

## (undated) DEVICE — Device

## (undated) DEVICE — DRSG GZ OIL EMUL CURAD 3X8 --

## (undated) DEVICE — SOLUTION IV 1000ML 0.9% SOD CHL

## (undated) DEVICE — TRAY PREP DRY W/ PREM GLV 2 APPL 6 SPNG 2 UNDPD 1 OVERWRAP

## (undated) DEVICE — STANDARD HYPODERMIC NEEDLE,POLYPROPYLENE HUB: Brand: MONOJECT

## (undated) DEVICE — BUTTON SWITCH PENCIL BLADE ELECTRODE, HOLSTER: Brand: EDGE

## (undated) DEVICE — ZIMMER® STERILE DISPOSABLE TOURNIQUET CUFF WITH PLC, DUAL PORT, SINGLE BLADDER, 18 IN. (46 CM)

## (undated) DEVICE — INTENDED FOR TISSUE SEPARATION, AND OTHER PROCEDURES THAT REQUIRE A SHARP SURGICAL BLADE TO PUNCTURE OR CUT.: Brand: BARD-PARKER ® STAINLESS STEEL BLADES

## (undated) DEVICE — (D)SYR 10ML 1/5ML GRAD NSAF -- PKGING CHANGE USE ITEM 338027

## (undated) DEVICE — AMD ANTIMICROBIAL BANDAGE ROLL,6 PLY: Brand: KERLIX

## (undated) DEVICE — SUT ETHLN 3-0 18IN PS2 BLK --

## (undated) DEVICE — STOCKINETTE: Brand: DEROYAL

## (undated) DEVICE — SUTURE VCRL SZ 4-0 L27IN ABSRB UD L19MM FS-2 3/8 CIR REV J422H